# Patient Record
(demographics unavailable — no encounter records)

---

## 2025-01-03 NOTE — HISTORY OF PRESENT ILLNESS
[Dull/Aching] : dull/aching [Localized] : localized [Tightness] : tightness [de-identified] :  01/03/2025: She presents with her mother for initial orthopedic evaluation.  She is a right-hand-dominant female who had a fall while snowboarding several days ago.  She was evaluated at the clinic at the [place] she did not have x-rays taken she was placed into a removable brace.  She states pain in the distribution of the distal radius and limitations with range of motion  She is a student at the [employer] she will be returning back to school over the next week [] : no [FreeTextEntry1] : RT wrist  [FreeTextEntry5] : RT wrist pain that developed a few days ago. States that UC had splint prior.

## 2025-01-03 NOTE — IMAGING
[de-identified] : Right wrist-mild swelling, positive tenderness to palpation over the distal radial styloid and anatomic snuffbox.  There is no deformity appreciated.  She tolerates gentle range of motion with pain on flexion and extension.  She is neuro vastly intact distally  X-rays taken today of the right wrist and 3 views possible small fracture line through the distal radial styloid

## 2025-01-03 NOTE — ASSESSMENT
[FreeTextEntry1] : She had a traumatic injury to the right wrist with pain in the distal radius and anatomic snuffbox on exam x-rays are inconclusive possible fracture  Discussed the role of as needed NSAIDs and ice She will order a cock up brace off of [employer] We will set her up with a stat MRI for evaluation of possible fracture and follow-up for delineation of treatment plan before she returns back to school

## 2025-01-07 NOTE — IMAGING
[de-identified] : Right wrist-mild swelling, positive tenderness to palpation over the distal radial styloid and anatomic snuffbox.  There is no deformity appreciated.  She tolerates gentle range of motion with pain on flexion and extension.  She is neuro vastly intact distally  X-rays taken today of the right wrist and 3 views possible small fracture line through the distal radial styloid

## 2025-01-07 NOTE — HISTORY OF PRESENT ILLNESS
[Dull/Aching] : dull/aching [Localized] : localized [Tightness] : tightness [de-identified] : 01/07/2025 Had MRI: non displaced fx distal radial metaphysis  01/03/2025: She presents with her mother for initial orthopedic evaluation.  She is a right-hand-dominant female who had a fall while snowboarding several days ago.  [facility] [place] [facility] taken she was placed into a removable brace.  She states pain in the distribution of the distal radius and limitations with range of motion  She is a student at the [employer] she will be returning back to school over the next week [] : no [FreeTextEntry1] : RT wrist  [FreeTextEntry5] : RT wrist pain that developed a few days ago. States that UC had splint prior.